# Patient Record
Sex: MALE | Race: OTHER | HISPANIC OR LATINO | Employment: FULL TIME | URBAN - METROPOLITAN AREA
[De-identification: names, ages, dates, MRNs, and addresses within clinical notes are randomized per-mention and may not be internally consistent; named-entity substitution may affect disease eponyms.]

---

## 2020-02-03 ENCOUNTER — OFFICE VISIT (OUTPATIENT)
Dept: FAMILY MEDICINE CLINIC | Facility: CLINIC | Age: 25
End: 2020-02-03
Payer: COMMERCIAL

## 2020-02-03 ENCOUNTER — APPOINTMENT (OUTPATIENT)
Dept: LAB | Facility: HOSPITAL | Age: 25
End: 2020-02-03
Payer: COMMERCIAL

## 2020-02-03 VITALS
WEIGHT: 242 LBS | RESPIRATION RATE: 18 BRPM | SYSTOLIC BLOOD PRESSURE: 110 MMHG | BODY MASS INDEX: 28 KG/M2 | HEIGHT: 78 IN | DIASTOLIC BLOOD PRESSURE: 68 MMHG | TEMPERATURE: 98.1 F | HEART RATE: 70 BPM

## 2020-02-03 DIAGNOSIS — R04.0 EPISTAXIS: ICD-10-CM

## 2020-02-03 DIAGNOSIS — R25.1 TREMOR: ICD-10-CM

## 2020-02-03 DIAGNOSIS — R25.1 TREMOR: Primary | ICD-10-CM

## 2020-02-03 LAB
ALBUMIN SERPL BCP-MCNC: 4.9 G/DL (ref 3–5.2)
ALP SERPL-CCNC: 51 U/L (ref 43–122)
ALT SERPL W P-5'-P-CCNC: 19 U/L (ref 9–52)
ANION GAP SERPL CALCULATED.3IONS-SCNC: 12 MMOL/L (ref 5–14)
AST SERPL W P-5'-P-CCNC: 17 U/L (ref 17–59)
BASOPHILS # BLD AUTO: 0.1 THOUSANDS/ΜL (ref 0–0.1)
BASOPHILS NFR BLD AUTO: 1 % (ref 0–1)
BILIRUB SERPL-MCNC: 0.6 MG/DL
BUN SERPL-MCNC: 10 MG/DL (ref 5–25)
CALCIUM ALBUM COR SERPL-MCNC: 9.6 MG/DL (ref 8.3–10.1)
CALCIUM SERPL-MCNC: 10.3 MG/DL (ref 8.4–10.2)
CHLORIDE SERPL-SCNC: 102 MMOL/L (ref 97–108)
CHOLEST SERPL-MCNC: 161 MG/DL
CO2 SERPL-SCNC: 29 MMOL/L (ref 22–30)
CREAT SERPL-MCNC: 0.76 MG/DL (ref 0.7–1.5)
EOSINOPHIL # BLD AUTO: 0.1 THOUSAND/ΜL (ref 0–0.4)
EOSINOPHIL NFR BLD AUTO: 1 % (ref 0–6)
ERYTHROCYTE [DISTWIDTH] IN BLOOD BY AUTOMATED COUNT: 13.7 %
FERRITIN SERPL-MCNC: 148 NG/ML (ref 8–388)
GFR SERPL CREATININE-BSD FRML MDRD: 127 ML/MIN/1.73SQ M
GLUCOSE P FAST SERPL-MCNC: 98 MG/DL (ref 70–99)
HCT VFR BLD AUTO: 44.2 % (ref 41–53)
HDLC SERPL-MCNC: 55 MG/DL
HGB BLD-MCNC: 14.8 G/DL (ref 13.5–17.5)
LDLC SERPL CALC-MCNC: 85 MG/DL
LYMPHOCYTES # BLD AUTO: 1.9 THOUSANDS/ΜL (ref 0.5–4)
LYMPHOCYTES NFR BLD AUTO: 26 % (ref 25–45)
MCH RBC QN AUTO: 30.6 PG (ref 26–34)
MCHC RBC AUTO-ENTMCNC: 33.5 G/DL (ref 31–36)
MCV RBC AUTO: 91 FL (ref 80–100)
MONOCYTES # BLD AUTO: 0.8 THOUSAND/ΜL (ref 0.2–0.9)
MONOCYTES NFR BLD AUTO: 10 % (ref 1–10)
NEUTROPHILS # BLD AUTO: 4.7 THOUSANDS/ΜL (ref 1.8–7.8)
NEUTS SEG NFR BLD AUTO: 62 % (ref 45–65)
PLATELET # BLD AUTO: 318 THOUSANDS/UL (ref 150–450)
PMV BLD AUTO: 8 FL (ref 8.9–12.7)
POTASSIUM SERPL-SCNC: 4.7 MMOL/L (ref 3.6–5)
PROT SERPL-MCNC: 9.1 G/DL (ref 5.9–8.4)
RBC # BLD AUTO: 4.84 MILLION/UL (ref 4.5–5.9)
SODIUM SERPL-SCNC: 143 MMOL/L (ref 137–147)
TRIGL SERPL-MCNC: 106 MG/DL
TSH SERPL DL<=0.05 MIU/L-ACNC: 0.98 UIU/ML (ref 0.47–4.68)
VIT B12 SERPL-MCNC: 471 PG/ML (ref 100–900)
WBC # BLD AUTO: 7.5 THOUSAND/UL (ref 4.5–11)

## 2020-02-03 PROCEDURE — 82607 VITAMIN B-12: CPT

## 2020-02-03 PROCEDURE — 85025 COMPLETE CBC W/AUTO DIFF WBC: CPT | Performed by: PHYSICIAN ASSISTANT

## 2020-02-03 PROCEDURE — 3008F BODY MASS INDEX DOCD: CPT | Performed by: PHYSICIAN ASSISTANT

## 2020-02-03 PROCEDURE — 36415 COLL VENOUS BLD VENIPUNCTURE: CPT | Performed by: PHYSICIAN ASSISTANT

## 2020-02-03 PROCEDURE — 99203 OFFICE O/P NEW LOW 30 MIN: CPT | Performed by: PHYSICIAN ASSISTANT

## 2020-02-03 PROCEDURE — 83970 ASSAY OF PARATHORMONE: CPT | Performed by: PHYSICIAN ASSISTANT

## 2020-02-03 PROCEDURE — 80053 COMPREHEN METABOLIC PANEL: CPT | Performed by: PHYSICIAN ASSISTANT

## 2020-02-03 PROCEDURE — 4004F PT TOBACCO SCREEN RCVD TLK: CPT | Performed by: PHYSICIAN ASSISTANT

## 2020-02-03 PROCEDURE — 80061 LIPID PANEL: CPT | Performed by: PHYSICIAN ASSISTANT

## 2020-02-03 PROCEDURE — 82728 ASSAY OF FERRITIN: CPT | Performed by: PHYSICIAN ASSISTANT

## 2020-02-03 PROCEDURE — 84443 ASSAY THYROID STIM HORMONE: CPT | Performed by: PHYSICIAN ASSISTANT

## 2020-02-03 NOTE — PROGRESS NOTES
Assessment and Plan:  Patient Instructions   1  Tremor-patient has had a worsening tremor, most notable in his left hand  This seems to be at rest and with intention about the same  He denies any family history of tremor  He denies any exposure to heavy metals or toxins  He is interested in getting testing done as it has made it more difficult for him to function  Patient was referred to neurology for further evaluation  Blood work was ordered  2  Epistaxis-patient with increasing frequency of nose bleeds  Recommend assessing full blood work including CBC and ruling out anemia  3  Overweight-continue with diet and exercise efforts  Problem List Items Addressed This Visit     None      Visit Diagnoses     Tremor    -  Primary    Relevant Orders    CBC and differential    Comprehensive metabolic panel    Lipid Panel with Direct LDL reflex    TSH, 3rd generation with Free T4 reflex    Ambulatory referral to Neurology    Ferritin    Vitamin B12    Epistaxis        Relevant Orders    CBC and differential    Comprehensive metabolic panel    Lipid Panel with Direct LDL reflex    TSH, 3rd generation with Free T4 reflex    Ambulatory referral to Neurology    Ferritin    Vitamin B12                 Diagnoses and all orders for this visit:    Tremor  -     CBC and differential  -     Comprehensive metabolic panel  -     Lipid Panel with Direct LDL reflex  -     TSH, 3rd generation with Free T4 reflex  -     Ambulatory referral to Neurology; Future  -     Ferritin  -     Vitamin B12; Future    Epistaxis  -     CBC and differential  -     Comprehensive metabolic panel  -     Lipid Panel with Direct LDL reflex  -     TSH, 3rd generation with Free T4 reflex  -     Ambulatory referral to Neurology; Future  -     Ferritin  -     Vitamin B12; Future              Subjective:      Patient ID: Celia Jensen is a 21 y o  male      CC:    Chief Complaint   Patient presents with    Nose Bleed     pt says sometimes he has nose bleeds    Hands     pt says he hands keep shaking    New Pt     pt wants to establish a PCP       HPI:    HPI:  This is a 58-year-old gentleman that presents to the office as a new patient, originally from the [facility]  He has been concerned with a tremor that has developed in the past year and seems to be worse on the left hand  His tremor is present at rest but also seems to bother him when he is trying to do things  He works at [employer] and has noticed it been getting worse  He also has nose bleeds which have been happening more frequently  Sometimes he states that these will actually wake him from sleep  He is not aware of any specific family history of anemia or tremors  He denies any occupational exposure to heavy metals or toxins  The following portions of the patient's history were reviewed and updated as appropriate: allergies, current medications, past family history, past medical history, past social history, past surgical history and problem list       Review of Systems   Constitutional: Negative for chills, fatigue and fever  HENT: Negative for congestion, ear pain and sinus pressure  Eyes: Negative for visual disturbance  Respiratory: Negative for cough, chest tightness and shortness of breath  Cardiovascular: Negative for chest pain and palpitations  Gastrointestinal: Negative for diarrhea, nausea and vomiting  Endocrine: Negative for polyuria  Genitourinary: Negative for dysuria and frequency  Musculoskeletal: Negative for arthralgias and myalgias  Skin: Negative for pallor and rash  Neurological: Positive for tremors  Negative for dizziness, weakness, light-headedness, numbness and headaches  Psychiatric/Behavioral: Negative for agitation, behavioral problems and sleep disturbance  All other systems reviewed and are negative          Data to review:       Objective:    Vitals:    02/03/20 1500   BP: 110/68   BP Location: Left arm   Patient times per week  Tobacco Cessation Counseling: Tobacco cessation counseling was provided   The patient is sincerely urged to quit consumption of tobacco  He is not ready to quit tobacco

## 2020-02-03 NOTE — PATIENT INSTRUCTIONS
1   Tremor-patient has had a worsening tremor, most notable in his left hand  This seems to be at rest and with intention about the same  He denies any family history of tremor  He denies any exposure to heavy metals or toxins  He is interested in getting testing done as it has made it more difficult for him to function  Patient was referred to neurology for further evaluation  Blood work was ordered  2  Epistaxis-patient with increasing frequency of nose bleeds  Recommend assessing full blood work including CBC and ruling out anemia  3  Overweight-continue with diet and exercise efforts

## 2020-02-04 ENCOUNTER — TELEPHONE (OUTPATIENT)
Dept: FAMILY MEDICINE CLINIC | Facility: CLINIC | Age: 25
End: 2020-02-04

## 2020-02-04 DIAGNOSIS — E83.52 HYPERCALCEMIA: Primary | ICD-10-CM

## 2020-02-04 LAB — PTH-INTACT SERPL-MCNC: 67.8 PG/ML (ref 16.7–78.9)

## 2020-02-04 NOTE — TELEPHONE ENCOUNTER
Blood work shows that his calcium level is a little bit high  I will try to a parathyroid hormone level to his blood work that was just drawn so that we can not rule out a hyperparathyroid causing his tremor  Rest of labs appear normal   He should keep follow-up with Neurology as planned

## 2021-08-18 ENCOUNTER — TELEPHONE (OUTPATIENT)
Dept: FAMILY MEDICINE CLINIC | Facility: CLINIC | Age: 26
End: 2021-08-18

## 2021-08-18 NOTE — TELEPHONE ENCOUNTER
On 8/17/2021 patient established care with Jinny Mendez PA-C    [address], [facility]   [phone] (Fax)

## 2021-08-23 NOTE — TELEPHONE ENCOUNTER
08/23/21 9:53 AM     Thank you for your request  Your request has been received, reviewed, and the patient chart updated  The PCP has successfully been removed with a patient attribution note  This message will now be completed      Thank you  Carmen Lemos

## 2024-02-12 ENCOUNTER — HOSPITAL ENCOUNTER (EMERGENCY)
Facility: HOSPITAL | Age: 29
Discharge: HOME | End: 2024-02-12
Admitting: EMERGENCY MEDICINE
Payer: COMMERCIAL

## 2024-02-12 VITALS
DIASTOLIC BLOOD PRESSURE: 72 MMHG | SYSTOLIC BLOOD PRESSURE: 120 MMHG | TEMPERATURE: 97.9 F | HEART RATE: 122 BPM | RESPIRATION RATE: 18 BRPM | OXYGEN SATURATION: 98 %

## 2024-02-12 DIAGNOSIS — J11.1 INFLUENZA: Primary | ICD-10-CM

## 2024-02-12 LAB
FLUAV RNA RESP QL NAA+PROBE: NEGATIVE
FLUBV RNA RESP QL NAA+PROBE: POSITIVE
RSV RNA RESP QL NAA+PROBE: NEGATIVE
SARS-COV-2 RNA RESP QL NAA+PROBE: NEGATIVE

## 2024-02-12 PROCEDURE — 99283 EMERGENCY DEPT VISIT LOW MDM: CPT

## 2024-02-12 PROCEDURE — 0241U HB NFCT DS VIR RESP RNA 4 TRGT: CPT | Performed by: EMERGENCY MEDICINE

## 2024-02-12 PROCEDURE — 99284 EMERGENCY DEPT VISIT MOD MDM: CPT | Performed by: NURSE PRACTITIONER

## 2024-02-12 NOTE — Clinical Note
Ronal Alarcon was seen and treated in our emergency department on 2/12/2024.                Diagnosis:     Ronal  may return to work on return date.    He may return on this date: 02/15/2024         If you have any questions or concerns, please don't hesitate to call.      SOCORRO Perez    ______________________________           _______________          _______________  Hospital Representative                              Date                                Time

## 2024-02-12 NOTE — ED PROVIDER NOTES
History  Chief Complaint   Patient presents with    Flu Symptoms     Pt co generalized body pain, sore throat, body aches, severe headache and coughing. Fever since yesterday morning. Last dose of motrin 0830.      28-year-old male patient presenting here with his girlfriend with reports of fever and bodyaches since yesterday morning.  Also complaining of sore throat headache and occasional cough.  Patient's family member reports that some close contacts were also sick.      Flu Symptoms  Presenting symptoms: cough, fever and headache    Presenting symptoms: no shortness of breath, no sore throat and no vomiting    Associated symptoms: chills    Associated symptoms: no ear pain        None       History reviewed. No pertinent past medical history.    Past Surgical History:   Procedure Laterality Date    KNEE SURGERY         Family History   Problem Relation Age of Onset    No Known Problems Mother     No Known Problems Father      I have reviewed and agree with the history as documented.    E-Cigarette/Vaping     E-Cigarette/Vaping Substances     Social History     Tobacco Use    Smoking status: Some Days    Smokeless tobacco: Never    Tobacco comments:     Hooka   Substance Use Topics    Alcohol use: Not Currently    Drug use: Not Currently       Review of Systems   Constitutional:  Positive for chills and fever.   HENT:  Negative for ear pain and sore throat.    Eyes:  Negative for pain and visual disturbance.   Respiratory:  Positive for cough. Negative for shortness of breath.    Cardiovascular:  Negative for chest pain and palpitations.   Gastrointestinal:  Negative for abdominal pain and vomiting.   Genitourinary:  Negative for dysuria and hematuria.   Musculoskeletal:  Positive for arthralgias. Negative for back pain.   Skin:  Negative for color change and rash.   Neurological:  Positive for headaches. Negative for seizures and syncope.   All other systems reviewed and are negative.      Physical  Exam  Physical Exam  Vitals and nursing note reviewed.   Constitutional:       General: He is not in acute distress.     Appearance: He is well-developed.   HENT:      Head: Normocephalic and atraumatic.   Eyes:      General:         Right eye: No discharge.         Left eye: No discharge.      Conjunctiva/sclera: Conjunctivae normal.   Cardiovascular:      Rate and Rhythm: Normal rate.   Pulmonary:      Effort: Pulmonary effort is normal. No respiratory distress.   Abdominal:      General: There is no distension.      Tenderness: There is no guarding.   Musculoskeletal:         General: No deformity.      Cervical back: Normal range of motion and neck supple.   Skin:     General: Skin is warm and dry.   Neurological:      Mental Status: He is alert and oriented to person, place, and time.      Coordination: Coordination normal.         Vital Signs  ED Triage Vitals [02/12/24 1119]   Temperature Pulse Respirations Blood Pressure SpO2   97.9 °F (36.6 °C) (!) 122 18 120/72 98 %      Temp src Heart Rate Source Patient Position - Orthostatic VS BP Location FiO2 (%)   -- -- -- -- --      Pain Score       --           Vitals:    02/12/24 1119   BP: 120/72   Pulse: (!) 122         Visual Acuity      ED Medications  Medications - No data to display    Diagnostic Studies  Results Reviewed       Procedure Component Value Units Date/Time    FLU/RSV/COVID - if FLU/RSV clinically relevant [[id number]]  (Abnormal) Collected: 02/12/24 1121    Lab Status: Final result Specimen: Nares from Nose Updated: 02/12/24 1203     SARS-CoV-2 Negative     INFLUENZA A PCR Negative     INFLUENZA B PCR Positive     RSV PCR Negative    Narrative:      FOR PEDIATRIC PATIENTS - copy/paste COVID Guidelines URL to browser: [email or web address]    SARS-CoV-2 assay is a Nucleic Acid Amplification assay intended for the  qualitative detection of nucleic acid from SARS-CoV-2 in nasopharyngeal  swabs.  Results are for the presumptive identification of SARS-CoV-2 RNA.    Positive results are indicative of infection with SARS-CoV-2, the virus  causing COVID-19, but do not rule out bacterial infection or co-infection  with other viruses. Laboratories within the [place] and its  territories are required to report all positive results to the appropriate  public health authorities. Negative results do not preclude SARS-CoV-2  infection and should not be used as the sole basis for treatment or other  patient management decisions. Negative results must be combined with  clinical observations, patient history, and epidemiological information.  This test has not been FDA cleared or approved.    This test has been authorized by FDA under an Emergency Use Authorization  (EUA). This test is only authorized for the duration of time the  declaration that circumstances exist justifying the authorization of the  emergency use of an in vitro diagnostic tests for detection of SARS-CoV-2  virus and/or diagnosis of COVID-19 infection under section 564(b)(1) of  the Act, 21 U.S.C. 360bbb-3(b)(1), unless the authorization is terminated  or revoked sooner. The test has been validated but independent review by FDA  and CLIA is pending.    Test performed using [employer] GeneXpert: This RT-PCR assay targets N2,  a region unique to SARS-CoV-2. A conserved region in the E-gene was chosen  for pan-Sarbecovirus detection which includes SARS-CoV-2.    According to CMS-2020-01-R, this platform meets the definition of high-throughput technology.                   No orders to display              Procedures  Procedures         ED Course                                             Medical Decision Making  Influenza an otherwise healthy 28-year-old male.  Supportive therapy at home with fever control rest and hydration.             Disposition  Final diagnoses:   Influenza     Time reflects when diagnosis was documented in both MDM as applicable  and the Disposition within this note       Time User Action Codes Description Comment    2/12/2024 12:18 PM Maximiliano Palma Add [J11.1] Influenza           ED Disposition       ED Disposition   Discharge    Condition   Stable    Date/Time   Mon Feb 12, 2024 12:18 PM    Comment   Ronal Alarcon discharge to home/self care.                   Follow-up Information       Follow up With Specialties Details Why Contact Info    Natasha Benson PA-C Physician Assistant  As needed [address] [place] [id number] [phone]              Patient's Medications    No medications on file       No discharge procedures on file.    PDMP Review       None            ED Provider  Electronically Signed by             SOCORRO Perez  02/12/24 8048

## 2024-07-15 ENCOUNTER — OFFICE VISIT (OUTPATIENT)
Dept: URGENT CARE | Facility: CLINIC | Age: 29
End: 2024-07-15
Payer: COMMERCIAL

## 2024-07-15 VITALS
WEIGHT: 278 LBS | HEIGHT: 77 IN | SYSTOLIC BLOOD PRESSURE: 133 MMHG | DIASTOLIC BLOOD PRESSURE: 87 MMHG | OXYGEN SATURATION: 100 % | HEART RATE: 78 BPM | BODY MASS INDEX: 32.82 KG/M2

## 2024-07-15 DIAGNOSIS — J02.9 SORE THROAT: ICD-10-CM

## 2024-07-15 DIAGNOSIS — J30.9 ALLERGIC RHINITIS, UNSPECIFIED SEASONALITY, UNSPECIFIED TRIGGER: Primary | ICD-10-CM

## 2024-07-15 LAB — S PYO AG THROAT QL: NEGATIVE

## 2024-07-15 PROCEDURE — 99213 OFFICE O/P EST LOW 20 MIN: CPT

## 2024-07-15 PROCEDURE — 87880 STREP A ASSAY W/OPTIC: CPT

## 2024-07-15 PROCEDURE — 87070 CULTURE OTHR SPECIMN AEROBIC: CPT

## 2024-07-15 NOTE — PATIENT INSTRUCTIONS
Check my chart for throat culture results.  Over the counter zyrtec and flonase.   Tylenol and Motrin for throat pain and fever.  Cool liquids, soft foods.  Warm tea with honey.  Salt water gargles.  Throat lozenges, halls, or chloraseptic throat spray as needed.    PCP Follow up  Go to nearest emergency room if difficulty breathing or swallowing occurs.     Pharyngitis   WHAT YOU NEED TO KNOW:   Pharyngitis, or sore throat, is inflammation of the tissues and structures in your pharynx (throat). Pharyngitis is most often caused by bacteria. It may also be caused by a cold or flu virus. Other causes include smoking, allergies, or acid reflux.   DISCHARGE INSTRUCTIONS:   Call 911 for any of the following:   You have trouble breathing or swallowing because your throat is swollen or sore.      Return to the emergency department if:   You are drooling because it hurts too much to swallow.    Your fever is higher than 102?F (39?C) or lasts longer than 3 days.    You are confused.    You taste blood in your throat.    Contact your healthcare provider if:   Your throat pain gets worse.    You have a painful lump in your throat that does not go away after 5 days.    Your symptoms do not improve after 5 days.    You have questions or concerns about your condition or care.    Medicines:  Viral pharyngitis will go away on its own without treatment. Your sore throat should start to feel better in 3 to 5 days for both viral and bacterial infections. You may need any of the following:  Antibiotics  treat a bacterial infection.    NSAIDs , such as ibuprofen, help decrease swelling, pain, and fever. NSAIDs can cause stomach bleeding or kidney problems in certain people. If you take blood thinner medicine, always ask your healthcare provider if NSAIDs are safe for you. Always read the medicine label and follow directions.    Acetaminophen  decreases pain and fever. It is available without a doctor's order. Ask how much to take and  how often to take it. Follow directions. Acetaminophen can cause liver damage if not taken correctly.    Take your medicine as directed.  Contact your healthcare provider if you think your medicine is not helping or if you have side effects. Tell him or her if you are allergic to any medicine. Keep a list of the medicines, vitamins, and herbs you take. Include the amounts, and when and why you take them. Bring the list or the pill bottles to follow-up visits. Carry your medicine list with you in case of an emergency.    Manage your symptoms:   Gargle salt water.  Mix ¼ teaspoon salt in an 8 ounce glass of warm water and gargle. This may help decrease swelling in your throat.    Drink liquids as directed.  You may need to drink more liquids than usual. Liquids may help soothe your throat and prevent dehydration. Ask how much liquid to drink each day and which liquids are best for you.    Use a cool-steam humidifier  to help moisten the air in your room and calm your cough.    Soothe your throat  with cough drops, ice, soft foods, or popsicles.    Prevent the spread of pharyngitis:  Cover your mouth and nose when you cough or sneeze. Do not share food or drinks. Wash your hands often. Use soap and water. If soap and water are unavailable, use an alcohol based hand .   Follow up with your healthcare provider as directed:  Write down your questions so you remember to ask them during your visits.   © Copyright [employer] 2021 Information is for End User's use only and may not be sold, redistributed or otherwise used for commercial purposes. All illustrations and images included in CareNotes® are the copyrighted property of [employer].D.A[employer]M., Inc[employer] or [employer]  The above information is an  only. It is not intended as medical advice for individual conditions or treatments. Talk to your doctor, nurse or pharmacist before following any medical regimen to see if it is safe and effective for  you.

## 2024-07-15 NOTE — LETTER
July 15, 2024     Patient: Ronal Alarcon   YOB: 1995   Date of Visit: 7/15/2024       To Whom it May Concern:    Ronal Alarcon was seen in my clinic on 7/15/2024. He may return to work on 7/16/2024 .    If you have any questions or concerns, please don't hesitate to call.         Sincerely,          SOCORRO Falcon        CC: No Recipients

## 2024-07-15 NOTE — PROGRESS NOTES
[facility]        NAME: Ronal Alarcon is a 29 y.o. male  : 1995    MRN: [id number]  DATE: July 15, 2024  TIME: 5:35 PM    Assessment and Plan   Allergic rhinitis, unspecified seasonality, unspecified trigger [J30.9]  1. Allergic rhinitis, unspecified seasonality, unspecified trigger  Ambulatory Referral to Otolaryngology      2. Sore throat  POCT rapid ANTIGEN strepA    Throat culture        Rapid strep negative. Will send for culture.  Work note given.     Patient Instructions     Check my chart for throat culture results.  Over the counter zyrtec and flonase.   Tylenol and Motrin for throat pain and fever.  Cool liquids, soft foods.  Warm tea with honey.  Salt water gargles.  Throat lozenges, halls, or chloraseptic throat spray as needed.    PCP Follow up  Go to nearest emergency room if difficulty breathing or swallowing occurs.     Chief Complaint     Chief Complaint   Patient presents with    Sore Throat     3 weeks, post nasal and has been feeling slight swelling of glands on both sides. Patient is having difficulty at night to breath when laying flat. Patient has been taking Motrin. Patient mentioned when he swallows it has a bad taste in his mouth.          History of Present Illness       The patient presents today with complaints of sore throat, nasal congestion, and post nasal drip x 3 weeks. He states the sore throat is worse at night. Denies fever/chills, cough, SOB, n/v/d, rash. He has been taking motrin as needed with minimal relief. Denies history of seasonal allergies. Admits to sleeping with windows open at night.         Review of Systems   Review of Systems   Constitutional:  Negative for chills and fever.   HENT:  Positive for congestion, postnasal drip and sore throat. Negative for ear pain, sinus pressure and sinus pain.    Respiratory:  Negative for cough, chest tightness, shortness of breath and wheezing.    Gastrointestinal:  Negative for abdominal pain, diarrhea, nausea  "and vomiting.   Musculoskeletal:  Negative for myalgias.   Skin:  Negative for rash.         Current Medications     No current outpatient medications on file.    Current Allergies     Allergies as of 07/15/2024    (No Known Allergies)            The following portions of the patient's history were reviewed and updated as appropriate: allergies, current medications, past family history, past medical history, past social history, past surgical history and problem list.     History reviewed. No pertinent past medical history.    Past Surgical History:   Procedure Laterality Date    KNEE SURGERY         Family History   Problem Relation Age of Onset    No Known Problems Mother     No Known Problems Father          Medications have been verified.        Objective   /87   Pulse 78   Ht 6' 5\" (1.956 m)   Wt 126 kg (278 lb)   SpO2 100%   BMI 32.97 kg/m²        Physical Exam     Physical Exam  Vitals and nursing note reviewed.   Constitutional:       General: He is not in acute distress.     Appearance: Normal appearance. He is not ill-appearing.   HENT:      Head: Normocephalic and atraumatic.      Right Ear: Tympanic membrane, ear canal and external ear normal.      Left Ear: Tympanic membrane, ear canal and external ear normal.      Nose: Congestion present. No rhinorrhea.      Mouth/Throat:      Lips: Pink.      Mouth: Mucous membranes are moist.      Pharynx: Posterior oropharyngeal erythema present. No oropharyngeal exudate.      Tonsils: No tonsillar exudate. 2+ on the right. 2+ on the left.   Eyes:      General: Vision grossly intact.      Extraocular Movements: Extraocular movements intact.      Pupils: Pupils are equal, round, and reactive to light.   Cardiovascular:      Rate and Rhythm: Normal rate and regular rhythm.      Heart sounds: Normal heart sounds. No murmur heard.  Pulmonary:      Effort: Pulmonary effort is normal. No respiratory distress.      Breath sounds: Normal breath sounds. No " decreased air movement. No decreased breath sounds, wheezing, rhonchi or rales.   Musculoskeletal:         General: Normal range of motion.      Cervical back: Normal range of motion.   Lymphadenopathy:      Cervical: No cervical adenopathy.   Skin:     General: Skin is warm.      Findings: No rash.   Neurological:      Mental Status: He is alert and oriented to person, place, and time.      Motor: Motor function is intact.      Gait: Gait is intact.   Psychiatric:         Attention and Perception: Attention normal.         Mood and Affect: Mood normal.

## 2024-07-17 LAB — BACTERIA THROAT CULT: NORMAL

## 2024-08-12 ENCOUNTER — HOSPITAL ENCOUNTER (EMERGENCY)
Facility: HOSPITAL | Age: 29
Discharge: HOME | End: 2024-08-13
Attending: EMERGENCY MEDICINE
Payer: COMMERCIAL

## 2024-08-12 ENCOUNTER — APPOINTMENT (EMERGENCY)
Dept: RADIOLOGY | Facility: HOSPITAL | Age: 29
End: 2024-08-12
Payer: COMMERCIAL

## 2024-08-12 VITALS
OXYGEN SATURATION: 100 % | WEIGHT: 282.19 LBS | TEMPERATURE: 97.7 F | HEART RATE: 75 BPM | SYSTOLIC BLOOD PRESSURE: 137 MMHG | DIASTOLIC BLOOD PRESSURE: 85 MMHG | RESPIRATION RATE: 17 BRPM | BODY MASS INDEX: 33.46 KG/M2

## 2024-08-12 DIAGNOSIS — M54.9 BACK PAIN: Primary | ICD-10-CM

## 2024-08-12 PROCEDURE — 72100 X-RAY EXAM L-S SPINE 2/3 VWS: CPT

## 2024-08-12 PROCEDURE — 99283 EMERGENCY DEPT VISIT LOW MDM: CPT

## 2024-08-12 PROCEDURE — 96372 THER/PROPH/DIAG INJ SC/IM: CPT

## 2024-08-12 RX ORDER — KETOROLAC TROMETHAMINE 30 MG/ML
15 INJECTION, SOLUTION INTRAMUSCULAR; INTRAVENOUS ONCE
Status: COMPLETED | OUTPATIENT
Start: 2024-08-12 | End: 2024-08-12

## 2024-08-12 RX ADMIN — KETOROLAC TROMETHAMINE 15 MG: 30 INJECTION, SOLUTION INTRAMUSCULAR; INTRAVENOUS at 23:50

## 2024-08-12 NOTE — Clinical Note
Ronal Alarcon was seen and treated in our emergency department on 8/12/2024.                Diagnosis:     Ronal  may return to work on return date.    He may return on this date: 08/16/2024         If you have any questions or concerns, please don't hesitate to call.      Terry Conley MD    ______________________________           _______________          _______________  Hospital Representative                              Date                                Time

## 2024-08-13 PROCEDURE — 99284 EMERGENCY DEPT VISIT MOD MDM: CPT | Performed by: EMERGENCY MEDICINE

## 2024-08-13 RX ORDER — IBUPROFEN 800 MG/1
800 TABLET, FILM COATED ORAL 3 TIMES DAILY
Qty: 21 TABLET | Refills: 0 | Status: SHIPPED | OUTPATIENT
Start: 2024-08-13

## 2024-08-13 NOTE — ED PROVIDER NOTES
History  Chief Complaint   Patient presents with    Back Pain     Pt reports R lower back pain. Denies radiating pains. Denies injury / heavy lifting. Denies N/V/D. Denies urinary complaints.      29-year-old male presented to the emergency department for evaluation of low back pain.  Patient describes lifting heavy objects at work regularly but cannot recall any specific event or inciting cause for his pain, this began gradually over the past few days it is now aching in the right paralumbar area with radiation into the sciatic distribution down the right leg, there is no associated numbness weakness or tingling.  He has had no bowel or bladder incontinence and has no saddle anesthesia.  He has had no fevers or chills.        None       History reviewed. No pertinent past medical history.    Past Surgical History:   Procedure Laterality Date    KNEE SURGERY         Family History   Problem Relation Age of Onset    No Known Problems Mother     No Known Problems Father      I have reviewed and agree with the history as documented.    E-Cigarette/Vaping    E-Cigarette Use Never User      E-Cigarette/Vaping Substances     Social History     Tobacco Use    Smoking status: Never    Smokeless tobacco: Never    Tobacco comments:     Hooka   Vaping Use    Vaping status: Never Used   Substance Use Topics    Alcohol use: Not Currently    Drug use: Not Currently       Review of Systems   Constitutional:  Negative for appetite change, chills, fatigue and fever.   HENT:  Negative for sneezing and sore throat.    Eyes:  Negative for visual disturbance.   Respiratory:  Negative for cough, choking, chest tightness, shortness of breath and wheezing.    Cardiovascular:  Negative for chest pain and palpitations.   Gastrointestinal:  Negative for abdominal pain, constipation, diarrhea, nausea and vomiting.   Genitourinary:  Negative for difficulty urinating and dysuria.   Musculoskeletal:  Positive for back pain.   Neurological:   Negative for dizziness, weakness, light-headedness, numbness and headaches.   All other systems reviewed and are negative.      Physical Exam  Physical Exam  Vitals and nursing note reviewed.   Constitutional:       General: He is not in acute distress.     Appearance: He is well-developed. He is not diaphoretic.   HENT:      Head: Normocephalic and atraumatic.   Eyes:      Pupils: Pupils are equal, round, and reactive to light.   Neck:      Vascular: No JVD.      Trachea: No tracheal deviation.   Cardiovascular:      Rate and Rhythm: Normal rate and regular rhythm.      Heart sounds: Normal heart sounds. No murmur heard.     No friction rub. No gallop.   Pulmonary:      Effort: Pulmonary effort is normal. No respiratory distress.      Breath sounds: Normal breath sounds. No wheezing or rales.   Abdominal:      General: Bowel sounds are normal. There is no distension.      Palpations: Abdomen is soft.      Tenderness: There is no abdominal tenderness. There is no guarding or rebound.   Musculoskeletal:      Comments: There is right-sided paralumbar muscular tenderness.  He has 5 out of 5 strength in the bilateral lower extremities.   Skin:     General: Skin is warm and dry.      Coloration: Skin is not pale.   Neurological:      Mental Status: He is alert and oriented to person, place, and time.      Cranial Nerves: No cranial nerve deficit.      Motor: No abnormal muscle tone.   Psychiatric:         Behavior: Behavior normal.         Vital Signs  ED Triage Vitals   Temperature Pulse Respirations Blood Pressure SpO2   08/12/24 2304 08/12/24 2304 08/12/24 2304 08/12/24 2304 08/12/24 2304   97.7 °F (36.5 °C) 75 17 137/85 100 %      Temp Source Heart Rate Source Patient Position - Orthostatic VS BP Location FiO2 (%)   08/12/24 2304 08/12/24 2304 08/12/24 2304 08/12/24 2304 --   Temporal Monitor Sitting Left arm       Pain Score       08/12/24 2350       8           Vitals:    08/12/24 2304   BP: 137/85   Pulse: 75    Patient Position - Orthostatic VS: Sitting         Visual Acuity      ED Medications  Medications   ketorolac (TORADOL) injection 15 mg (15 mg Intramuscular Given 8/12/24 4400)       Diagnostic Studies  Results Reviewed       None                   XR spine lumbar 2 or 3 views injury    (Results Pending)              Procedures  Procedures         ED Course                                 SBIRT 20yo+      Flowsheet Row Most Recent Value   Initial Alcohol Screen: US AUDIT-C     1. How often do you have a drink containing alcohol? 0 Filed at: 08/13/2024 0000   2. How many drinks containing alcohol do you have on a typical day you are drinking?  0 Filed at: 08/13/2024 0000   3a. Male UNDER 65: How often do you have five or more drinks on one occasion? 0 Filed at: 08/13/2024 0000   3b. FEMALE Any Age, or MALE 65+: How often do you have 4 or more drinks on one occassion? 0 Filed at: 08/13/2024 0000   Audit-C Score 0 Filed at: 08/13/2024 0000   KIESHA: How many times in the past year have you...    Used an illegal drug or used a prescription medication for non-medical reasons? Never Filed at: 08/13/2024 0000                      Medical Decision Making  29-year-old male with low back pain will check x-ray give NSAID, reassess.    Amount and/or Complexity of Data Reviewed  Radiology: ordered.    Risk  Prescription drug management.                 Disposition  Final diagnoses:   Back pain     Time reflects when diagnosis was documented in both MDM as applicable and the Disposition within this note       Time User Action Codes Description Comment    8/13/2024 12:18 AM Terry Conley Add [M54.9] Back pain           ED Disposition       ED Disposition   Discharge    Condition   Stable    Date/Time   Tue Aug 13, 2024 0018    Comment   Ronal Alarcon discharge to home/self care.                   Follow-up Information    None         Discharge Medication List as of 8/13/2024 12:21 AM        START taking these medications    Details    ibuprofen (MOTRIN) 800 mg tablet Take 1 tablet (800 mg total) by mouth 3 (three) times a day, Starting Tue 8/13/2024, Normal                 PDMP Review       None            ED Provider  Electronically Signed by             Terry Conley MD  08/13/24 7269

## 2024-08-14 ENCOUNTER — TELEPHONE (OUTPATIENT)
Dept: PHYSICAL THERAPY | Facility: OTHER | Age: 29
End: 2024-08-14

## 2024-08-14 NOTE — TELEPHONE ENCOUNTER
Call placed to the patient per [facility] referral.    V/M left in Estonian for patient to call back. Phone number and hours of business provided.    This is the 1st attempt to reach the patient. Will defer referral per protocol.    Possible W/C?

## 2024-08-22 NOTE — TELEPHONE ENCOUNTER
Call placed to the patient per [facility] referral.    Voice message left for patient to call back. Phone number and hours of business provided.     Per [facility] protocol will close referral and assist Pt when call back is received    Closed  I.S#[id number] [place]  Possible WC???

## 2024-09-17 ENCOUNTER — HOSPITAL ENCOUNTER (EMERGENCY)
Facility: HOSPITAL | Age: 29
Discharge: HOME | End: 2024-09-17
Attending: EMERGENCY MEDICINE
Payer: COMMERCIAL

## 2024-09-17 VITALS
WEIGHT: 285.72 LBS | RESPIRATION RATE: 14 BRPM | HEART RATE: 85 BPM | SYSTOLIC BLOOD PRESSURE: 123 MMHG | OXYGEN SATURATION: 98 % | DIASTOLIC BLOOD PRESSURE: 75 MMHG | BODY MASS INDEX: 33.88 KG/M2 | TEMPERATURE: 97.9 F

## 2024-09-17 DIAGNOSIS — S02.5XXA FRACTURED TOOTH: ICD-10-CM

## 2024-09-17 DIAGNOSIS — K08.89 DENTALGIA: Primary | ICD-10-CM

## 2024-09-17 PROCEDURE — 99282 EMERGENCY DEPT VISIT SF MDM: CPT

## 2024-09-17 PROCEDURE — 99284 EMERGENCY DEPT VISIT MOD MDM: CPT | Performed by: EMERGENCY MEDICINE

## 2024-09-17 RX ORDER — IBUPROFEN 600 MG/1
600 TABLET, FILM COATED ORAL EVERY 6 HOURS PRN
Qty: 12 TABLET | Refills: 0 | Status: SHIPPED | OUTPATIENT
Start: 2024-09-17

## 2024-09-17 RX ORDER — PENICILLIN V POTASSIUM 500 MG/1
500 TABLET, FILM COATED ORAL 2 TIMES DAILY
Qty: 10 TABLET | Refills: 0 | Status: SHIPPED | OUTPATIENT
Start: 2024-09-17 | End: 2024-09-22

## 2024-09-17 NOTE — ED NOTES
Pt discharged by ED provider Chad. This RN not at the bedside.      Mar Hebert, RN  09/17/24 1316

## 2024-09-17 NOTE — ED PROVIDER NOTES
1. Dentalgia    2. Fractured tooth      ED Disposition       ED Disposition   Discharge    Condition   Stable    Date/Time   Tue Sep 17, 2024  3:50 PM    Comment   Ronal Alarcon discharge to home/self care.                   Assessment & Plan       Medical Decision Making  Risk  Prescription drug management.      Patient cracked his tooth while eating.  Will place on antibiotics and have him follow-up with his dentist which apparently has his own dentist.  I did give him resources for the dental clinic just in case.  I encouraged him to go get some dental wax from the pharmacy and place it over that hole to protect the area.  There appears that there is a fair amount of tooth left not sure if it is salvageable.                 Medications - No data to display    History of Present Illness       HPI    Chief Complaint   Patient presents with    Dental Pain     Pt reports was eating something and hurt left back molar.      History reviewed. No pertinent past medical history.  Past Surgical History:   Procedure Laterality Date    KNEE SURGERY       Prior to Admission medications    Medication Sig Start Date End Date Taking? Authorizing Provider   ibuprofen (MOTRIN) 600 mg tablet Take 1 tablet (600 mg total) by mouth every 6 (six) hours as needed for mild pain for up to 12 doses 9/17/24  Yes Serafin Bonilla MD   penicillin V potassium (VEETID) 500 mg tablet Take 1 tablet (500 mg total) by mouth 2 (two) times a day for 5 days 9/17/24 9/22/24 Yes Serafin Bonilla MD   ibuprofen (MOTRIN) 800 mg tablet Take 1 tablet (800 mg total) by mouth 3 (three) times a day 8/13/24   Terry Conley MD     Social History     Socioeconomic History    Marital status: Single     Spouse name: Not on file    Number of children: Not on file    Years of education: Not on file    Highest education level: Not on file   Occupational History    Not on file   Tobacco Use    Smoking status: Never    Smokeless tobacco: Never     Tobacco comments:     Hooka   Vaping Use    Vaping status: Never Used   Substance and Sexual Activity    Alcohol use: Not Currently    Drug use: Not Currently    Sexual activity: Not on file   Other Topics Concern    Not on file   Social History Narrative    Not on file     Social Determinants of Health     Financial Resource Strain: Not on file   Food Insecurity: Not on file   Transportation Needs: Not on file   Physical Activity: Not on file   Stress: Not on file   Social Connections: Not on file   Intimate Partner Violence: Not on file   Housing Stability: Not on file     Patient complaining of back right upper molar pain after eating something and felt pain in the upper molar.  Hot cold intolerance.  No fever or facial swelling.  He was eating something crunchy.    Review of Systems        Objective     ED Triage Vitals [09/17/24 1513]   Temperature Pulse Blood Pressure Respirations SpO2 Patient Position - Orthostatic VS   97.9 °F (36.6 °C) 85 123/75 14 98 % Sitting      Temp src Heart Rate Source BP Location FiO2 (%) Pain Score    -- Monitor Right arm -- --        Physical Exam  Constitutional:       General: He is not in acute distress.     Appearance: Normal appearance. He is not ill-appearing, toxic-appearing or diaphoretic.   HENT:      Head: Normocephalic and atraumatic.      Comments: No facial swelling     Mouth/Throat:      Dentition: Abnormal dentition. Dental tenderness present. No gingival swelling or dental abscesses.      Tongue: No lesions.      Pharynx: Oropharynx is clear. Uvula midline. No pharyngeal swelling, oropharyngeal exudate or posterior oropharyngeal erythema.     Cardiovascular:      Rate and Rhythm: Normal rate and regular rhythm.   Pulmonary:      Effort: Pulmonary effort is normal. No respiratory distress.   Musculoskeletal:         General: Normal range of motion.      Cervical back: Normal range of motion.   Lymphadenopathy:      Cervical: No cervical adenopathy.   Skin:      General: Skin is warm.      Findings: No erythema.   Neurological:      General: No focal deficit present.      Mental Status: He is alert.   Psychiatric:         Mood and Affect: Mood normal.         Labs Reviewed - No data to display  No orders to display       Procedures       Serafin Bonilla MD  09/18/24 0942

## 2024-09-17 NOTE — ED NOTES
See assessment done by ED provider. This RN not at the bedside.     Mar Hebert, RN  09/17/24 3808

## 2024-09-17 NOTE — Clinical Note
Ronal Alarcon was seen and treated in our emergency department on 9/17/2024.                Diagnosis:     Ronal  may return to work on return date.    He may return on this date: 09/18/2024         If you have any questions or concerns, please don't hesitate to call.      Mar Hebert RN    ______________________________           _______________          _______________  Hospital Representative                              Date                                Time

## 2024-10-10 ENCOUNTER — APPOINTMENT (OUTPATIENT)
Dept: RADIOLOGY | Facility: MEDICAL CENTER | Age: 29
End: 2024-10-10
Payer: COMMERCIAL

## 2024-10-10 ENCOUNTER — OCCMED (OUTPATIENT)
Dept: URGENT CARE | Facility: MEDICAL CENTER | Age: 29
End: 2024-10-10
Payer: OTHER MISCELLANEOUS

## 2024-10-10 DIAGNOSIS — M54.41 ACUTE RIGHT-SIDED LOW BACK PAIN WITH RIGHT-SIDED SCIATICA: ICD-10-CM

## 2024-10-10 DIAGNOSIS — M54.41 ACUTE RIGHT-SIDED LOW BACK PAIN WITH RIGHT-SIDED SCIATICA: Primary | ICD-10-CM

## 2024-10-10 PROCEDURE — 72100 X-RAY EXAM L-S SPINE 2/3 VWS: CPT

## 2024-10-10 PROCEDURE — G0384 LEV 5 HOSP TYPE B ED VISIT: HCPCS | Performed by: PHYSICIAN ASSISTANT

## 2024-10-10 PROCEDURE — 99285 EMERGENCY DEPT VISIT HI MDM: CPT | Performed by: PHYSICIAN ASSISTANT

## 2024-10-10 PROCEDURE — 96372 THER/PROPH/DIAG INJ SC/IM: CPT | Performed by: PHYSICIAN ASSISTANT

## 2024-10-13 ENCOUNTER — HOSPITAL ENCOUNTER (EMERGENCY)
Facility: HOSPITAL | Age: 29
Discharge: HOME | End: 2024-10-13
Attending: EMERGENCY MEDICINE
Payer: OTHER MISCELLANEOUS

## 2024-10-13 VITALS
TEMPERATURE: 98.2 F | RESPIRATION RATE: 17 BRPM | OXYGEN SATURATION: 98 % | HEART RATE: 89 BPM | SYSTOLIC BLOOD PRESSURE: 126 MMHG | DIASTOLIC BLOOD PRESSURE: 77 MMHG

## 2024-10-13 DIAGNOSIS — M54.50 ACUTE RIGHT-SIDED LOW BACK PAIN WITHOUT SCIATICA: Primary | ICD-10-CM

## 2024-10-13 PROCEDURE — 96372 THER/PROPH/DIAG INJ SC/IM: CPT

## 2024-10-13 PROCEDURE — 99284 EMERGENCY DEPT VISIT MOD MDM: CPT | Performed by: EMERGENCY MEDICINE

## 2024-10-13 PROCEDURE — 99283 EMERGENCY DEPT VISIT LOW MDM: CPT

## 2024-10-13 RX ORDER — TIZANIDINE 2 MG/1
4 TABLET ORAL ONCE
Status: COMPLETED | OUTPATIENT
Start: 2024-10-13 | End: 2024-10-13

## 2024-10-13 RX ORDER — TIZANIDINE HYDROCHLORIDE 4 MG/1
4 CAPSULE, GELATIN COATED ORAL 3 TIMES DAILY PRN
Qty: 15 CAPSULE | Refills: 0 | Status: SHIPPED | OUTPATIENT
Start: 2024-10-13

## 2024-10-13 RX ORDER — HYDROCODONE BITARTRATE AND ACETAMINOPHEN 5; 325 MG/1; MG/1
1 TABLET ORAL ONCE
Status: COMPLETED | OUTPATIENT
Start: 2024-10-13 | End: 2024-10-13

## 2024-10-13 RX ORDER — LIDOCAINE 50 MG/G
1 PATCH TOPICAL ONCE
Status: DISCONTINUED | OUTPATIENT
Start: 2024-10-13 | End: 2024-10-13 | Stop reason: HOSPADM

## 2024-10-13 RX ORDER — KETOROLAC TROMETHAMINE 30 MG/ML
30 INJECTION, SOLUTION INTRAMUSCULAR; INTRAVENOUS ONCE
Status: COMPLETED | OUTPATIENT
Start: 2024-10-13 | End: 2024-10-13

## 2024-10-13 RX ORDER — LIDOCAINE 50 MG/G
1 PATCH TOPICAL DAILY PRN
Qty: 14 PATCH | Refills: 0 | Status: SHIPPED | OUTPATIENT
Start: 2024-10-13

## 2024-10-13 RX ADMIN — LIDOCAINE 1 PATCH: 700 PATCH TOPICAL at 17:07

## 2024-10-13 RX ADMIN — HYDROCODONE BITARTRATE AND ACETAMINOPHEN 1 TABLET: 5; 325 TABLET ORAL at 18:13

## 2024-10-13 RX ADMIN — TIZANIDINE 4 MG: 2 TABLET ORAL at 17:06

## 2024-10-13 RX ADMIN — KETOROLAC TROMETHAMINE 30 MG: 30 INJECTION, SOLUTION INTRAMUSCULAR at 17:07

## 2024-10-13 NOTE — ED PROVIDER NOTES
Time reflects when diagnosis was documented in both MDM as applicable and the Disposition within this note       Time User Action Codes Description Comment    10/13/2024  6:27 PM Kanchan Tavarez Add [M54.50] Acute right-sided low back pain without sciatica           ED Disposition       ED Disposition   Discharge    Condition   Stable    Date/Time   Sun Oct 13, 2024  6:27 PM    Comment   Ronal Alarcon discharge to home/self care.                   Assessment & Plan       Medical Decision Making  Patient is a 29-year-old male who presents to the emergency department with persistent back pain.  He suffered a lifting injury while at work on the ninth and was seen by occupational health.  They prescribed prednisone and Robaxin with limited improvement.  He has no red flag signs or symptoms to suggest cauda equina.  Pain localizes to the right lumbar region paraspinal muscles.  Offered alternatives for pain management and muscle relaxation.     Risk  Prescription drug management.             Medications   lidocaine (LIDODERM) 5 % patch 1 patch (1 patch Topical Medication Applied 10/13/24 1707)   ketorolac (TORADOL) injection 30 mg (30 mg Intramuscular Given 10/13/24 1707)   tiZANidine (ZANAFLEX) tablet 4 mg (4 mg Oral Given 10/13/24 1706)   HYDROcodone-acetaminophen (NORCO) 5-325 mg per tablet 1 tablet (1 tablet Oral Given 10/13/24 1813)       ED Risk Strat Scores                           SBIRT 22yo+      Flowsheet Row Most Recent Value   Initial Alcohol Screen: US AUDIT-C     1. How often do you have a drink containing alcohol? 0 Filed at: 10/13/2024 1631   2. How many drinks containing alcohol do you have on a typical day you are drinking?  0 Filed at: 10/13/2024 1631   3a. Male UNDER 65: How often do you have five or more drinks on one occasion? 0 Filed at: 10/13/2024 1631   Audit-C Score 0 Filed at: 10/13/2024 1631   KIESHA: How many times in the past year have you...    Used an illegal drug or used a  prescription medication for non-medical reasons? Never Filed at: 10/13/2024 1631                            History of Present Illness       Chief Complaint   Patient presents with    Back Pain     C/O Lower back pain that radiates down his right leg. Hurt his back on the 9th.  Was seen at a [facility] urgent care.  States that medications aren't helping the pain.        No past medical history on file.   Past Surgical History:   Procedure Laterality Date    KNEE SURGERY        Family History   Problem Relation Age of Onset    No Known Problems Mother     No Known Problems Father       Social History     Tobacco Use    Smoking status: Never    Smokeless tobacco: Never    Tobacco comments:     Hooka   Vaping Use    Vaping status: Never Used   Substance Use Topics    Alcohol use: Not Currently    Drug use: Not Currently      E-Cigarette/Vaping    E-Cigarette Use Never User       E-Cigarette/Vaping Substances      I have reviewed and agree with the history as documented.     HPI    Review of Systems        Objective       ED Triage Vitals [10/13/24 1628]   Temperature Pulse Blood Pressure Respirations SpO2 Patient Position - Orthostatic VS   98.2 °F (36.8 °C) 89 126/77 17 98 % --      Temp Source Heart Rate Source BP Location FiO2 (%) Pain Score    Oral Monitor Right arm -- 8      Vitals      Date and Time Temp Pulse SpO2 Resp BP Pain Score FACES Pain Rating User   10/13/24 1813 -- -- -- -- -- 8 -- KT   10/13/24 1707 -- -- -- -- -- 8 -- KT   10/13/24 1628 98.2 °F (36.8 °C) 89 98 % 17 126/77 8 -- KT            Physical Exam  Vitals and nursing note reviewed.   Constitutional:       General: He is not in acute distress.     Appearance: Normal appearance.   HENT:      Head: Normocephalic and atraumatic.      Right Ear: External ear normal.      Left Ear: External ear normal.      Nose: Nose normal.   Cardiovascular:      Rate and Rhythm: Normal rate and regular rhythm.   Pulmonary:      Effort: Pulmonary effort is normal.       Breath sounds: Normal breath sounds.   Musculoskeletal:      Lumbar back: Spasms and tenderness present.   Skin:     General: Skin is warm and dry.   Neurological:      General: No focal deficit present.      Mental Status: He is alert and oriented to person, place, and time. Mental status is at baseline.   Psychiatric:         Behavior: Behavior normal.         Results Reviewed       None            No orders to display       Procedures    ED Medication and Procedure Management   Prior to Admission Medications   Prescriptions Last Dose Informant Patient Reported? Taking?   ibuprofen (MOTRIN) 600 mg tablet   No No   Sig: Take 1 tablet (600 mg total) by mouth every 6 (six) hours as needed for mild pain for up to 12 doses   ibuprofen (MOTRIN) 800 mg tablet   No No   Sig: Take 1 tablet (800 mg total) by mouth 3 (three) times a day      Facility-Administered Medications: None     Patient's Medications   Discharge Prescriptions    LIDOCAINE (LIDODERM) 5 %    Apply 1 patch topically over 12 hours daily as needed (pain) Remove & Discard patch within 12 hours or as directed by MD       Start Date: 10/13/2024End Date: --       Order Dose: 1 patch       Quantity: 14 patch    Refills: 0    TIZANIDINE (ZANAFLEX) 4 MG CAPSULE    Take 1 capsule (4 mg total) by mouth 3 (three) times a day as needed for muscle spasms       Start Date: 10/13/2024End Date: --       Order Dose: 4 mg       Quantity: 15 capsule    Refills: 0     No discharge procedures on file.  ED SEPSIS DOCUMENTATION   Time reflects when diagnosis was documented in both MDM as applicable and the Disposition within this note       Time User Action Codes Description Comment    10/13/2024  6:27 PM Kanchan Tavarez Add [M54.50] Acute right-sided low back pain without sciatica                  Kanchan Tavarez MD  10/13/24 0260

## 2024-10-13 NOTE — Clinical Note
Ronal Alarcon was seen and treated in our emergency department on 10/13/2024.    Occupational Medicine Follow Up Within 24 hours            Diagnosis:     Ronal  .    He may return on this date:     Must be cleared by occupational health to return to work     If you have any questions or concerns, please don't hesitate to call.      Kanchan Tavarez MD    ______________________________           _______________          _______________  Hospital Representative                              Date                                Time

## 2024-10-23 ENCOUNTER — APPOINTMENT (OUTPATIENT)
Dept: URGENT CARE | Facility: MEDICAL CENTER | Age: 29
End: 2024-10-23
Payer: OTHER MISCELLANEOUS

## 2024-10-23 PROCEDURE — 99214 OFFICE O/P EST MOD 30 MIN: CPT | Performed by: NURSE PRACTITIONER

## 2024-10-28 ENCOUNTER — APPOINTMENT (OUTPATIENT)
Dept: URGENT CARE | Facility: MEDICAL CENTER | Age: 29
End: 2024-10-28
Payer: OTHER MISCELLANEOUS

## 2024-10-28 PROCEDURE — 99213 OFFICE O/P EST LOW 20 MIN: CPT | Performed by: PHYSICIAN ASSISTANT

## 2024-10-29 ENCOUNTER — APPOINTMENT (OUTPATIENT)
Dept: URGENT CARE | Facility: MEDICAL CENTER | Age: 29
End: 2024-10-29
Payer: OTHER MISCELLANEOUS

## 2024-10-29 PROCEDURE — 99213 OFFICE O/P EST LOW 20 MIN: CPT | Performed by: NURSE PRACTITIONER

## 2024-11-13 ENCOUNTER — APPOINTMENT (OUTPATIENT)
Dept: URGENT CARE | Facility: MEDICAL CENTER | Age: 29
End: 2024-11-13
Payer: OTHER MISCELLANEOUS

## 2024-11-13 PROCEDURE — 99213 OFFICE O/P EST LOW 20 MIN: CPT | Performed by: NURSE PRACTITIONER

## 2024-11-25 ENCOUNTER — APPOINTMENT (EMERGENCY)
Dept: CT IMAGING | Facility: HOSPITAL | Age: 29
End: 2024-11-25
Payer: COMMERCIAL

## 2024-11-25 ENCOUNTER — HOSPITAL ENCOUNTER (EMERGENCY)
Facility: HOSPITAL | Age: 29
Discharge: HOME | End: 2024-11-25
Attending: EMERGENCY MEDICINE
Payer: COMMERCIAL

## 2024-11-25 VITALS
HEART RATE: 74 BPM | DIASTOLIC BLOOD PRESSURE: 80 MMHG | RESPIRATION RATE: 18 BRPM | BODY MASS INDEX: 35.27 KG/M2 | SYSTOLIC BLOOD PRESSURE: 141 MMHG | WEIGHT: 297.4 LBS | TEMPERATURE: 97.9 F | OXYGEN SATURATION: 100 %

## 2024-11-25 DIAGNOSIS — M51.27 HERNIATION OF INTERVERTEBRAL DISC BETWEEN L5 AND S1: ICD-10-CM

## 2024-11-25 DIAGNOSIS — M54.16 RIGHT LUMBAR RADICULOPATHY: Primary | ICD-10-CM

## 2024-11-25 PROCEDURE — 72128 CT CHEST SPINE W/O DYE: CPT

## 2024-11-25 PROCEDURE — 99283 EMERGENCY DEPT VISIT LOW MDM: CPT

## 2024-11-25 PROCEDURE — 72131 CT LUMBAR SPINE W/O DYE: CPT

## 2024-11-25 PROCEDURE — 99284 EMERGENCY DEPT VISIT MOD MDM: CPT | Performed by: EMERGENCY MEDICINE

## 2024-11-25 PROCEDURE — 96372 THER/PROPH/DIAG INJ SC/IM: CPT

## 2024-11-25 RX ORDER — LIDOCAINE 50 MG/G
1 PATCH TOPICAL ONCE
Status: DISCONTINUED | OUTPATIENT
Start: 2024-11-25 | End: 2024-11-26 | Stop reason: HOSPADM

## 2024-11-25 RX ORDER — PREDNISONE 20 MG/1
60 TABLET ORAL ONCE
Status: COMPLETED | OUTPATIENT
Start: 2024-11-25 | End: 2024-11-25

## 2024-11-25 RX ORDER — ACETAMINOPHEN 325 MG/1
975 TABLET ORAL ONCE
Status: COMPLETED | OUTPATIENT
Start: 2024-11-25 | End: 2024-11-25

## 2024-11-25 RX ORDER — TIZANIDINE 2 MG/1
4 TABLET ORAL ONCE
Status: COMPLETED | OUTPATIENT
Start: 2024-11-25 | End: 2024-11-25

## 2024-11-25 RX ORDER — KETOROLAC TROMETHAMINE 30 MG/ML
30 INJECTION, SOLUTION INTRAMUSCULAR; INTRAVENOUS ONCE
Status: COMPLETED | OUTPATIENT
Start: 2024-11-25 | End: 2024-11-25

## 2024-11-25 RX ORDER — CELECOXIB 200 MG/1
CAPSULE ORAL
Qty: 60 CAPSULE | Refills: 1 | Status: SHIPPED | OUTPATIENT
Start: 2024-11-25

## 2024-11-25 RX ORDER — PREDNISONE 10 MG/1
TABLET ORAL
Qty: 42 TABLET | Refills: 0 | Status: SHIPPED | OUTPATIENT
Start: 2024-11-25

## 2024-11-25 RX ADMIN — PREDNISONE 60 MG: 20 TABLET ORAL at 18:43

## 2024-11-25 RX ADMIN — ACETAMINOPHEN 975 MG: 325 TABLET, FILM COATED ORAL at 18:43

## 2024-11-25 RX ADMIN — KETOROLAC TROMETHAMINE 30 MG: 30 INJECTION, SOLUTION INTRAMUSCULAR at 18:47

## 2024-11-25 RX ADMIN — LIDOCAINE 5% 1 PATCH: 700 PATCH TOPICAL at 18:43

## 2024-11-25 RX ADMIN — TIZANIDINE 4 MG: 2 TABLET ORAL at 18:43

## 2024-11-25 NOTE — ED PROVIDER NOTES
Time reflects when diagnosis was documented in both MDM as applicable and the Disposition within this note       Time User Action Codes Description Comment    11/25/2024  8:21 PM Diamond Ford Add [M54.16] Right lumbar radiculopathy     11/25/2024  8:22 PM Diamond Ford Add [M51.27] Herniation of intervertebral disc between L5 and S1           ED Disposition       ED Disposition   Discharge    Condition   Stable    Date/Time   Mon Nov 25, 2024  8:22 PM    Comment   Ronal Alarcon discharge to home/self care.                   Assessment & Plan       Medical Decision Making  29-year-old male presents to the ED for progressively worsening right thoracolumbar back pain radiating into the right leg that started in early October when he lifted something heavy at work.  He has been going to physical therapy, following with his employer occupational medicine but still having worsening pain as well as intermittent paresthesia.  Patient likely has lumbar radiculopathy from possible disc herniation, disc bulge or degenerative disc disease.  He reports recently having an MRI but unable to visualize these results.  Recommended CT imaging of the thoracolumbar spine to assess for compression fracture or major disc herniation.  No clinical concern for cauda equina syndrome or epidural abscess at this time.  Will treat symptomatically with IM Toradol, Tylenol, lidocaine patch, tizanidine.  Will ultimately refer to comprehensive spine program but I did discuss with patient and girlfriend that given that this is work-related, he may have to follow-up with his occupational medicine.  Will also start patient on a course of prednisone for anti-inflammatory effects and prescribe Celebrex to be used instead of over-the-counter NSAIDs.    Amount and/or Complexity of Data Reviewed  Independent Historian:      Details: Patient's significant other  Radiology: ordered. Decision-making details documented in ED Course.    Risk  OTC  drugs.  Prescription drug management.        ED Course as of 11/25/24 2027   Mon Nov 25, 2024 1958 Patient reassessed and reports improvement in his pain.   2019 Updated patient and significant other of CT finding showing L5-S1 disc herniation.  I recommended he continue following up with occupational medicine through his employer but will also send referral to comprehensive spine program.  Will start patient on prednisone taper over the next 1 to 2 weeks as well as Celebrex to be used instead of other over-the-counter NSAIDs.  Recommended he also take Tylenol 1000 mg every 8 hours as needed as well as over-the-counter lidocaine or menthol patches.  Discussed ED return parameters.       Medications   lidocaine (LIDODERM) 5 % patch 1 patch (1 patch Topical Medication Applied 11/25/24 1843)   tiZANidine (ZANAFLEX) tablet 4 mg (4 mg Oral Given 11/25/24 1843)   ketorolac (TORADOL) injection 30 mg (30 mg Intramuscular Given 11/25/24 1847)   acetaminophen (TYLENOL) tablet 975 mg (975 mg Oral Given 11/25/24 1843)   predniSONE tablet 60 mg (60 mg Oral Given 11/25/24 1843)       ED Risk Strat Scores                             History of Present Illness       Chief Complaint   Patient presents with    Back Pain     Pt arrives ambulatory c/o mid back pain x 1 month. Pt reports increased pain since onset. Reports strenuous labor at work.        History reviewed. No pertinent past medical history.   Past Surgical History:   Procedure Laterality Date    KNEE SURGERY        Family History   Problem Relation Age of Onset    No Known Problems Mother     No Known Problems Father       Social History     Tobacco Use    Smoking status: Never    Smokeless tobacco: Never    Tobacco comments:     Hooka   Vaping Use    Vaping status: Never Used   Substance Use Topics    Alcohol use: Not Currently    Drug use: Not Currently      E-Cigarette/Vaping    E-Cigarette Use Never User       E-Cigarette/Vaping Substances      I have reviewed and  agree with the history as documented.     Patient is a 29-year-old male with no significant past medical history, presenting to the emergency department for worsening right low back pain.  Patient significant other provided English and Estonian translation.  Per patient, he had a back injury at work on 10/9/24 when he was lifting a very heavy object and he felt acute pain in his right low back.  Since then he has been dealing with this pain which radiates into the right buttocks and right posterior thigh.  He states the pain has been getting progressively worse despite doing physical therapy and taking prescribed medication that was given to him by his employer occupational medicine program (unsure what medication it is).  Significant other also reports that he did recently have an MRI with [facility] of his back but they do not have results and according to care everywhere, no visible MRI on record.  Patient reports that sitting for prolonged time seems to exacerbate the pain.  He reports he was seen in the ED at [facility] for this and medication he was given at that time did help but he is not sure what it was.  According to records, patient was seen here on 10/13 and was treated with tizanidine, lidocaine patch, Vicodin, Toradol.  According to records patient also recently had x-rays of the lumbar spine in October which were unremarkable.  On review of systems, patient admits to intermittent numbness and tingling sensation in the right leg but denies any new weakness of the leg or his leg giving out on him.  He denies any bowel or bladder incontinence or retention, saddle anesthesia, fevers or chills.  Rest of review of systems is negative.      History provided by:  Patient and significant other   used: Yes (Patient significant other provided English and Estonian translation.)    Back Pain  Associated symptoms: numbness    Associated symptoms: no abdominal pain, no chest pain, no  dysuria, no fever, no headaches and no weakness        Review of Systems   Constitutional:  Negative for chills and fever.   HENT:  Negative for congestion, ear pain, rhinorrhea and sore throat.    Respiratory:  Negative for cough, chest tightness, shortness of breath and wheezing.    Cardiovascular:  Negative for chest pain and palpitations.   Gastrointestinal:  Negative for abdominal pain, constipation, diarrhea, nausea and vomiting.   Genitourinary:  Negative for dysuria, flank pain, frequency and hematuria.   Musculoskeletal:  Positive for back pain. Negative for neck pain and neck stiffness.   Skin:  Negative for color change, pallor, rash and wound.   Allergic/Immunologic: Negative for immunocompromised state.   Neurological:  Positive for numbness. Negative for dizziness, syncope, facial asymmetry, speech difficulty, weakness, light-headedness and headaches.   Hematological:  Negative for adenopathy. Does not bruise/bleed easily.   Psychiatric/Behavioral:  Negative for confusion and decreased concentration.    All other systems reviewed and are negative.          Objective       ED Triage Vitals   Temperature Pulse Blood Pressure Respirations SpO2 Patient Position - Orthostatic VS   11/25/24 1654 11/25/24 1654 11/25/24 1654 11/25/24 1654 11/25/24 1654 11/25/24 1654   97.9 °F (36.6 °C) 74 141/80 18 100 % Sitting      Temp Source Heart Rate Source BP Location FiO2 (%) Pain Score    11/25/24 1654 11/25/24 1654 11/25/24 1654 -- 11/25/24 1843    Temporal Monitor Left arm  8      Vitals      Date and Time Temp Pulse SpO2 Resp BP Pain Score FACES Pain Rating User   11/25/24 1847 -- -- -- -- -- 8 -- DO   11/25/24 1843 -- -- -- -- -- 8 -- DO   11/25/24 1654 97.9 °F (36.6 °C) 74 100 % 18 141/80 -- -- FB            Physical Exam  Vitals and nursing note reviewed.   Constitutional:       General: He is not in acute distress.     Appearance: Normal appearance. He is well-developed. He is not ill-appearing, toxic-appearing  or diaphoretic.   HENT:      Head: Normocephalic and atraumatic.      Right Ear: External ear normal.      Left Ear: External ear normal.      Nose: Nose normal.      Mouth/Throat:      Mouth: Mucous membranes are moist.      Pharynx: Oropharynx is clear.   Eyes:      Extraocular Movements: Extraocular movements intact.      Conjunctiva/sclera: Conjunctivae normal.   Neck:      Vascular: No JVD.   Cardiovascular:      Rate and Rhythm: Normal rate and regular rhythm.      Pulses: Normal pulses.      Heart sounds: Normal heart sounds. No murmur heard.     No friction rub. No gallop.   Pulmonary:      Effort: Pulmonary effort is normal. No respiratory distress.      Breath sounds: Normal breath sounds. No wheezing, rhonchi or rales.   Abdominal:      General: There is no distension.      Palpations: Abdomen is soft.      Tenderness: There is no abdominal tenderness. There is no guarding or rebound.   Musculoskeletal:         General: Tenderness present. No swelling. Normal range of motion.      Cervical back: Normal range of motion and neck supple. No rigidity.      Comments: +Midline tenderness in the thoracolumbar spine as well as right thoracolumbar paravertebral muscle tenderness and hypertonicity.   Skin:     General: Skin is warm and dry.      Coloration: Skin is not pale.      Findings: No erythema or rash.   Neurological:      General: No focal deficit present.      Mental Status: He is alert and oriented to person, place, and time.      Sensory: No sensory deficit.      Motor: No weakness.      Comments: 5/5 strength throughout bilateral lower extremities.  No gross sensory deficits.   Psychiatric:         Mood and Affect: Mood normal.         Behavior: Behavior normal.         Results Reviewed       None            CT spine thoracic and lumbar wo contrast   Final Interpretation by Oniel Tolentino MD (11/25 2008)         1. L5-S1 paracentral disc protrusion. No  evidence of nerve root encroachment.   2.  Unremarkable exam of the thoracic spine.            Workstation performed: [id number]             Procedures    ED Medication and Procedure Management   Prior to Admission Medications   Prescriptions Last Dose Informant Patient Reported? Taking?   TiZANidine (ZANAFLEX) 4 MG capsule   No No   Sig: Take 1 capsule (4 mg total) by mouth 3 (three) times a day as needed for muscle spasms   ibuprofen (MOTRIN) 600 mg tablet   No No   Sig: Take 1 tablet (600 mg total) by mouth every 6 (six) hours as needed for mild pain for up to 12 doses   ibuprofen (MOTRIN) 800 mg tablet   No No   Sig: Take 1 tablet (800 mg total) by mouth 3 (three) times a day   lidocaine (Lidoderm) 5 %   No No   Sig: Apply 1 patch topically over 12 hours daily as needed (pain) Remove & Discard patch within 12 hours or as directed by MD      Facility-Administered Medications: None     Patient's Medications   Discharge Prescriptions    CELECOXIB (CELEBREX) 200 MG CAPSULE    Take 1 tablet by mouth every 12-24 hours as needed for mild to moderate pain.  Take with food and avoid use with other NSAIDs (ibuprofen, aleve, mobic).       Start Date: 11/25/2024End Date: --       Order Dose: --       Quantity: 60 capsule    Refills: 1    PREDNISONE 10 MG TABLET    Take 60mg PO daily x 2 days, then 50mg daily x 2 days, then 40mg daily x 2 days, then 30mg daily x 2 days, then 20mg x 2 days, then 10mg daily x 2 days       Start Date: 11/25/2024End Date: --       Order Dose: --       Quantity: 42 tablet    Refills: 0    TIZANIDINE (ZANAFLEX) 4 MG TABLET    Take 1 tablet (4 mg total) by mouth every 8 (eight) hours as needed for muscle spasms       Start Date: 11/25/2024End Date: --       Order Dose: 4 mg       Quantity: 30 tablet    Refills: 0       ED SEPSIS DOCUMENTATION   Time reflects when diagnosis was documented in both MDM as applicable and the Disposition within this note       Time User Action Codes Description Comment    11/25/2024  8:21 PM Diamond Ford Add  [M54.16] Right lumbar radiculopathy     11/25/2024  8:22 PM Diamond Ford [M51.27] Herniation of intervertebral disc between L5 and S1                  Diamond Ford DO  11/25/24 2027

## 2024-11-25 NOTE — Clinical Note
Ronal Alarcon was seen and treated in our emergency department on 11/25/2024.            No heavy lifting and light duty until cleared by occupational medicine or spine specialist    Diagnosis: Lumbar disc herniation    Ronal  may return to work on return date.    He may return on this date: 11/27/2024         If you have any questions or concerns, please don't hesitate to call.      Diamond Ford, DO    ______________________________           _______________          _______________  Hospital Representative                              Date                                Time

## 2024-11-26 ENCOUNTER — APPOINTMENT (OUTPATIENT)
Dept: URGENT CARE | Facility: MEDICAL CENTER | Age: 29
End: 2024-11-26
Payer: OTHER MISCELLANEOUS

## 2024-11-26 ENCOUNTER — TELEPHONE (OUTPATIENT)
Dept: PHYSICAL THERAPY | Facility: OTHER | Age: 29
End: 2024-11-26

## 2024-11-26 PROCEDURE — 99214 OFFICE O/P EST MOD 30 MIN: CPT | Performed by: NURSE PRACTITIONER

## 2024-11-26 NOTE — DISCHARGE INSTRUCTIONS
Take the prescribed Celebrex 200mg, 1 tablet every 12 to 24 hours as needed for moderate pain.  Take this medication with food and avoid use with other NSAIDs such as ibuprofen, Aleve, Mobic.  You may add on Tylenol up to 1000 mg every 8 hours as needed for additional pain relief.  I also recommend to use over-the-counter Salonpas patches, either lidocaine or menthol/methyl salicylate.  Keep the patch on for 12 hours, then remove and leave off for 12 hours prior to placing a new patch.  You are also prescribed a Prednisone (steroid) taper over 12 days for anti-inflammatory effects and I recommend you take this medication with food and not on an empty stomach.  Follow-up with your employers occupational medicine as well as with a comprehensive spine program.  Return to the ER immediately if any of your symptoms worsen.

## 2024-11-26 NOTE — TELEPHONE ENCOUNTER
Call placed to the patient per Comprehensive Spine Program referral.    Call went straight to voicemail.    V/M left in Kyrgyz for patient to call back. Phone number and hours of business provided.    This is the 1st attempt to reach the patient. Will defer referral per protocol.    Note:   Pt had a back injury at work on 10/9/24 . Open claim?    Pt currently in PT for right lumbar pain at [facility].

## 2024-12-03 ENCOUNTER — APPOINTMENT (OUTPATIENT)
Dept: URGENT CARE | Facility: MEDICAL CENTER | Age: 29
End: 2024-12-03
Payer: OTHER MISCELLANEOUS

## 2024-12-03 PROCEDURE — 99214 OFFICE O/P EST MOD 30 MIN: CPT | Performed by: NURSE PRACTITIONER

## 2024-12-05 NOTE — TELEPHONE ENCOUNTER
Call placed to the patient per Comprehensive Spine Program referral.      V/M left in Portuguese for patient to call back. Phone number and hours of business provided.     This is the 2nd attempt to reach the patient. Will defer referral per protocol.     Note:   Pt had a back injury at work on 10/9/24 . Open claim?     Pt currently in PT for right lumbar pain at [facility].

## 2024-12-23 ENCOUNTER — OFFICE VISIT (OUTPATIENT)
Age: 29
End: 2024-12-23
Payer: COMMERCIAL

## 2024-12-23 VITALS — BODY MASS INDEX: 35.07 KG/M2 | HEIGHT: 77 IN | WEIGHT: 297 LBS

## 2024-12-23 DIAGNOSIS — M51.27 LUMBOSACRAL DISC HERNIATION: ICD-10-CM

## 2024-12-23 DIAGNOSIS — M54.16 LUMBAR RADICULOPATHY: Primary | ICD-10-CM

## 2024-12-23 PROCEDURE — 99204 OFFICE O/P NEW MOD 45 MIN: CPT | Performed by: ORTHOPAEDIC SURGERY

## 2024-12-23 NOTE — PROGRESS NOTES
"[facility] ORTHOPEDIC SPINE SURGERY  DR. FIDELIA MARTIN MD  [phone] [address]  [facility] [place] [address]  [place] [address]      HISTORY OF PRESENT ILLNESS:    Ronal Alarcon is a 29 y.o. male who presents to the office for evaluation of low back pain ongoing since lifting a heavy object at work on 10/09/2024. At the time of injury he did feel a \"pop\" in his lower back. He has been experiencing right sided low back pain that goes to his right buttock, down his posterior thigh and into his right foot. He also gets some sharp pain to his right iliac crest. He has numbness and tingling that goes down into his right foot. He has also noticed the inability to drive because he is unable to raise his right foot high enough. He has tried physical therapy but had to stop due to increased symptoms. He has been taking anti-inflammatories and muscle relaxers with minimal relief.  He is accompanied by a female today in the office who assists with translation.       Diabetic: No  Nicotine use: No  BMI: Estimated body mass index is 35.22 kg/m² as calculated from the following:    Height as of this encounter: 6' 5\" (1.956 m).    Weight as of this encounter: 135 kg (297 lb).  Blood thinners: None    ALLERGIES: No Known Allergies    MEDICATIONS:    Current Outpatient Medications:     celecoxib (CeleBREX) 200 mg capsule, Take 1 tablet by mouth every 12-24 hours as needed for mild to moderate pain.  Take with food and avoid use with other NSAIDs (ibuprofen, aleve, mobic)., Disp: 60 capsule, Rfl: 1    ibuprofen (MOTRIN) 600 mg tablet, Take 1 tablet (600 mg total) by mouth every 6 (six) hours as needed for mild pain for up to 12 doses, Disp: 12 tablet, Rfl: 0    ibuprofen (MOTRIN) 800 mg tablet, Take 1 tablet (800 mg total) by mouth 3 (three) times a day, Disp: 21 tablet, Rfl: 0    lidocaine (Lidoderm) 5 %, Apply 1 patch topically over 12 hours daily as needed (pain) Remove & Discard patch within 12 " hours or as directed by MD, Disp: 14 patch, Rfl: 0    TiZANidine (ZANAFLEX) 4 MG capsule, Take 1 capsule (4 mg total) by mouth 3 (three) times a day as needed for muscle spasms, Disp: 15 capsule, Rfl: 0    predniSONE 10 mg tablet, Take 60mg PO daily x 2 days, then 50mg daily x 2 days, then 40mg daily x 2 days, then 30mg daily x 2 days, then 20mg x 2 days, then 10mg daily x 2 days (Patient not taking: Reported on 12/23/2024), Disp: 42 tablet, Rfl: 0    tiZANidine (ZANAFLEX) 4 mg tablet, Take 1 tablet (4 mg total) by mouth every 8 (eight) hours as needed for muscle spasms (Patient not taking: Reported on 12/23/2024), Disp: 30 tablet, Rfl: 0     PAST MEDICAL HISTORY:   History reviewed. No pertinent past medical history.    PAST SURGICAL HISTORY:  Past Surgical History:   Procedure Laterality Date    KNEE SURGERY         SOCIAL HISTORY:  Social History     Tobacco Use   Smoking Status Never   Smokeless Tobacco Never   Tobacco Comments    Hooka          PHYSICAL EXAM:  29 y.o. male sitting uncomfortably on exam chair in no apparent distress.   Patient ambulates without antalgic gait.   Normal sagittal and coronal balance.    Able to walk on heels/toes.  TTP to lumbar spine     Sensation intact to light touch left L2 through S1 dermatomes.   Sensation intact to light touch right L2 through S1 dermatomes     Left Motor: 5/5 iliopsoas, 5/5 quadriceps, 5/5 tibialis anterior, 5/5 extensor hallucis longus, and 5/5 gastrocsoleus.   Right Motor: 5/5 iliopsoas, 5/5 quadriceps, 5/5 tibialis anterior, 4+/5 extensor hallucis longus, and 5/5 gastrocsoleus.     ROM:    Symmetric deep tendon reflexes bilateral upper extremities    Symmetric deep tendon reflexes bilateral lower extremities       Spurling test is  negative  Fletcher's sign negative  Radial release sign negative  Clonus negative  Babinski negative  Straight leg raise test is positive Right   Dayne's test negative Bilateral   ASIS compression negative Bilateral   ASIS  distraction negative Bilateral   Gaenslen's test negative Bilateral   The patient is well perfused distally.       RADIOGRAPHIC STUDIES:  XR lumbar spine 10/10/2024: Mild degenerative changes in the lower lumbar spine, L4-5 and L5-S1  MRI lumbar spine 11/19/2024: Disc desiccation without loss of disc height at L4-5.  No evidence of disc injury at this level.  At L5-S1 there is evidence of mild to moderate degenerative changes minimal loss of disc height and a central disc herniation posteriorly.  Minimal lateral recess stenosis present on the left side.  There is no evidence of lateral recess gnosis on the right side.  CT thoracolumbar spine 11/25/2024: L5-S1 paracentral disc protrusion    SPINE AND PAIN PROCEDURES:   none      ASSESSMENT:  1. Lumbar radiculopathy  -     Ambulatory referral to Spine & Pain Management; Future  2. Lumbosacral disc herniation  -     Ambulatory referral to Spine & Pain Management; Future      PLAN:  29 y.o. male presents for evaluation of a work related injury on 10/09/2024.  Apparently he has been under care of occupational medicine.  Unfortunate I do not have access to those records.  He was referred to physical therapy.  He was also seen in urgent care in November of this year and started physical therapy following that.  He has been thoracic 5-6 sessions of physical therapy through [facility].  He does have an MRI study which I reviewed.  More recently he was seen in the ER.    I did review the MRI study.  He does have a central disc condition at L5-S1.  There is no evidence of stenosis on the right side.  Minimal stenosis is present on the left side.    The natural history of disc herniations were discussed with the patient today. The best plan of care for the patient is to continue conservative treatment. He was provieded with a referral to pain management for further treatment options. He was encouraged to continue physical therapy. Surgery is not recommended.   At this time is not a surgical candidate.  If he fails conservative measures, surgery can be discussed.    I will see the patient back as needed.         Scribe Attestation      I,:  Jaquelin Reynolds am acting as a scribe while in the presence of the attending physician.:       I,:  Iron Graff, DO personally performed the services described in this documentation    as scribed in my presence.:

## 2025-07-02 ENCOUNTER — OFFICE VISIT (OUTPATIENT)
Dept: NEUROSURGERY | Facility: CLINIC | Age: 30
End: 2025-07-02
Payer: COMMERCIAL

## 2025-07-02 VITALS
WEIGHT: 297 LBS | SYSTOLIC BLOOD PRESSURE: 126 MMHG | RESPIRATION RATE: 17 BRPM | DIASTOLIC BLOOD PRESSURE: 80 MMHG | OXYGEN SATURATION: 99 % | HEIGHT: 77 IN | BODY MASS INDEX: 35.07 KG/M2 | TEMPERATURE: 98.6 F | HEART RATE: 82 BPM

## 2025-07-02 DIAGNOSIS — M51.369 LUMBAR DEGENERATIVE DISC DISEASE: ICD-10-CM

## 2025-07-02 DIAGNOSIS — G89.4 CHRONIC PAIN SYNDROME: Primary | ICD-10-CM

## 2025-07-02 PROCEDURE — 99203 OFFICE O/P NEW LOW 30 MIN: CPT | Performed by: NEUROLOGICAL SURGERY

## 2025-07-02 RX ORDER — OXYCODONE AND ACETAMINOPHEN 10; 325 MG/1; MG/1
1 TABLET ORAL
COMMUNITY
Start: 2025-06-28

## 2025-07-02 NOTE — PROGRESS NOTES
Name: Ronal Alarcon      : 1995      MRN: [id number]  Encounter Provider: Brian Nelson MD  Encounter Date: 2025   Encounter department: [facility]  :  Assessment & Plan  Chronic pain syndrome    Assessment & Plan  1. Right leg pain.  The MRI scan from 2024 reveals mild degenerative disc disease at the L4-5 and L5-S1 levels. However, there is no discernible cause for the right leg pain on the MRI. The possibility of a spinal cord stimulator trial was discussed, which could potentially alleviate both back and leg pain.  In general, surgery for chronic lower back pain is generally less effective.  A note will be sent to Dr. David Jacob and Jessica Sims regarding this discussion. If the trial proves beneficial, a permanent implant may be considered. If the trial does not yield significant relief, a referral to one of our partners for further evaluation will be made.       Lumbar degenerative disc disease             History of Present Illness     Ronal Alarcon is a 29 y.o. male who presents   History of Present Illness  The patient presents for evaluation of pain and numbness shooting down into the right leg.    He has been experiencing these symptoms since an incident at work on 10/09/2024, where he felt a crack in his back while lifting a box with a coworker. The pain, described as stabbing, is accompanied by weakness and numbness which radiates down his right leg and into his foot.  There is associated numbness and weakness. It intensifies when standing or sitting but eases slightly when lying down. Despite attempts to manage the pain with injections, physical therapy, and medications such as gabapentin, Neurontin, and Lyrica, there has been no significant improvement. He has also undergone an EMG test. His condition has prevented him from returning to work. Recently, similar stabbing pain has started on the left side. He rates his back pain as more  severe than the pain in his leg.  He denies any difficulties with bowel or bladder function or change in perineal sensation.    MEDICATIONS  PREVIOUS MEDS:  Gabapentin  Neurontin  Lyrica    Review of Systems   HENT:  Negative for trouble swallowing.    Genitourinary:  Negative for enuresis.        Pain with BM    Musculoskeletal:  Positive for back pain (right sided lbp into right hip and leg), gait problem (unsteady) and myalgias (right side of back).        Unable to drive leg will fall asleep or go numb    Neurological:  Positive for numbness (n+t right side of back down back of right leg to knee).   Hematological:  Does not bruise/bleed easily.     I have personally reviewed the MA's review of systems and made changes as necessary.    Past Medical History   Past Medical History[1]  Past Surgical History[2]  Family History[3]  he reports that he has never smoked. He has never used smokeless tobacco. He reports that he does not currently use alcohol. He reports that he does not currently use drugs.  Current Outpatient Medications   Medication Instructions    celecoxib (CeleBREX) 200 mg capsule Take 1 tablet by mouth every 12-24 hours as needed for mild to moderate pain.  Take with food and avoid use with other NSAIDs (ibuprofen, aleve, mobic).    ibuprofen (MOTRIN) 800 mg, Oral, 3 times daily    ibuprofen (MOTRIN) 600 mg, Oral, Every 6 hours PRN    lidocaine (Lidoderm) 5 % 1 patch, Topical, Daily PRN, Remove & Discard patch within 12 hours or as directed by MD    oxyCODONE-acetaminophen (PERCOCET)  mg per tablet 1 tablet    predniSONE 10 mg tablet Take 60mg PO daily x 2 days, then 50mg daily x 2 days, then 40mg daily x 2 days, then 30mg daily x 2 days, then 20mg x 2 days, then 10mg daily x 2 days    TiZANidine (ZANAFLEX) 4 mg, Oral, 3 times daily PRN    tiZANidine (ZANAFLEX) 4 mg, Oral, Every 8 hours PRN   Allergies[4]   Objective   /80 (BP Location: Left arm, Patient Position: Sitting, Cuff Size:  "Standard)   Pulse 82   Temp 98.6 °F (37 °C) (Temporal)   Resp 17   Ht 6' 5\" (1.956 m)   Wt 135 kg (297 lb)   SpO2 99%   BMI 35.22 kg/m²     Physical Exam  Neurological Exam    Radiology Results Review: I personally reviewed the following image studies in PACS and associated radiology reports: MRI spine. My interpretation of the radiology images/reports is: MRI of the lumbar spine without contrast dated November 19, 2024.  Normal lumbar lordosis.  Degenerative disc disease at L4-5 and L5-S1.  Central and left paracentral disc bulge at L5-S1 impinging on traversing nerve root.  No other areas of significant neural compression.  Intrathecal contents unremarkable.  Specifically, no right sided compression on neural structures.    Administrative Statements   I have spent a total time of 30 minutes in caring for this patient on the day of the visit/encounter including Diagnostic results, Prognosis, Patient and family education, Impressions, Counseling / Coordination of care, Documenting in the medical record, and Communicating with other healthcare professionals .           [1] No past medical history on file.  [2]   Past Surgical History:  Procedure Laterality Date    KNEE SURGERY     [3]   Family History  Problem Relation Name Age of Onset    No Known Problems Mother      No Known Problems Father     [4] No Known Allergies    "